# Patient Record
Sex: MALE | Race: WHITE | HISPANIC OR LATINO | Employment: UNEMPLOYED | URBAN - METROPOLITAN AREA
[De-identification: names, ages, dates, MRNs, and addresses within clinical notes are randomized per-mention and may not be internally consistent; named-entity substitution may affect disease eponyms.]

---

## 2019-09-12 ENCOUNTER — HOSPITAL ENCOUNTER (EMERGENCY)
Facility: HOSPITAL | Age: 24
Discharge: HOME | End: 2019-09-12
Attending: EMERGENCY MEDICINE

## 2019-09-12 VITALS
HEART RATE: 70 BPM | HEIGHT: 69 IN | SYSTOLIC BLOOD PRESSURE: 122 MMHG | TEMPERATURE: 98 F | BODY MASS INDEX: 29.62 KG/M2 | DIASTOLIC BLOOD PRESSURE: 75 MMHG | RESPIRATION RATE: 20 BRPM | OXYGEN SATURATION: 98 % | WEIGHT: 200 LBS

## 2019-09-12 DIAGNOSIS — S03.00XA DISLOCATION OF TEMPOROMANDIBULAR JOINT, INITIAL ENCOUNTER: Primary | ICD-10-CM

## 2019-09-12 PROCEDURE — 99152 MOD SED SAME PHYS/QHP 5/>YRS: CPT

## 2019-09-12 PROCEDURE — 63600175 PHARM REV CODE 636 W HCPCS: Performed by: PHYSICIAN ASSISTANT

## 2019-09-12 PROCEDURE — 96374 THER/PROPH/DIAG INJ IV PUSH: CPT

## 2019-09-12 PROCEDURE — 63600175 PHARM REV CODE 636 W HCPCS: Performed by: EMERGENCY MEDICINE

## 2019-09-12 PROCEDURE — 96375 TX/PRO/DX INJ NEW DRUG ADDON: CPT

## 2019-09-12 PROCEDURE — 99285 EMERGENCY DEPT VISIT HI MDM: CPT | Mod: 25

## 2019-09-12 PROCEDURE — 21480 CLTX TMPRMAND DISLC 1ST/SBSQ: CPT

## 2019-09-12 RX ORDER — DIAZEPAM 10 MG/2ML
10 INJECTION INTRAMUSCULAR
Status: COMPLETED | OUTPATIENT
Start: 2019-09-12 | End: 2019-09-12

## 2019-09-12 RX ORDER — KETOROLAC TROMETHAMINE 30 MG/ML
15 INJECTION, SOLUTION INTRAMUSCULAR; INTRAVENOUS
Status: COMPLETED | OUTPATIENT
Start: 2019-09-12 | End: 2019-09-12

## 2019-09-12 RX ORDER — PROPOFOL 10 MG/ML
100 VIAL (ML) INTRAVENOUS ONCE
Status: COMPLETED | OUTPATIENT
Start: 2019-09-12 | End: 2019-09-12

## 2019-09-12 RX ORDER — IBUPROFEN 600 MG/1
600 TABLET ORAL EVERY 6 HOURS PRN
Qty: 20 TABLET | Refills: 0 | Status: SHIPPED | OUTPATIENT
Start: 2019-09-12

## 2019-09-12 RX ADMIN — DIAZEPAM 10 MG: 5 INJECTION, SOLUTION INTRAMUSCULAR; INTRAVENOUS at 09:09

## 2019-09-12 RX ADMIN — PROPOFOL 100 MG: 10 INJECTION, EMULSION INTRAVENOUS at 10:09

## 2019-09-12 RX ADMIN — KETOROLAC TROMETHAMINE 15 MG: 30 INJECTION, SOLUTION INTRAMUSCULAR at 09:09

## 2019-09-12 NOTE — ED NOTES
23 year old male presents to ed cc of jaw pain patient reports was brushing teeth this morning yawned and jaw became dislocated patient not tolerating secretions at this time offered suction to patient patient able to suction own secretions patient awake alert ox4 no respiratory distress noted

## 2019-09-12 NOTE — ED PROVIDER NOTES
Encounter Date: 9/12/2019    SCRIBE #1 NOTE: I, Patricia Machado, am scribing for, and in the presence of,  Dr. Lefort. I have scribed the entire note.       History     Chief Complaint   Patient presents with    Jaw Pain     Pt reports had been yawning when his jaw became stuck. Possibly dislocated by visual assessment. Complaining of pain to jaw.      Raúl Grimaldo is a 23 y.o. male who  has no past medical history on file.    The patient presents to the ED due to jaw pain that occurred this morning. Patient reports he was brushing his teeth and yawning when his jaw became stuck at an open position. He reports this is the second time his jaw has dislocated. Patient has no other complaints.    The history is provided by the patient.     Review of patient's allergies indicates:  No Known Allergies  No past medical history on file.  No past surgical history on file.  No family history on file.  Social History     Tobacco Use    Smoking status: Not on file   Substance Use Topics    Alcohol use: Not on file    Drug use: Not on file     Review of Systems   Constitutional: Negative for chills and fever.   HENT: Negative for rhinorrhea, sore throat and trouble swallowing.         Jaw dislocation.   Eyes: Negative for visual disturbance.   Respiratory: Negative for cough and shortness of breath.    Cardiovascular: Negative for chest pain.   Gastrointestinal: Negative for abdominal pain, diarrhea and vomiting.   Genitourinary: Negative for dysuria and hematuria.   Musculoskeletal: Negative for back pain.   Skin: Negative for rash.   Neurological: Negative for numbness and headaches.   Hematological: Negative for adenopathy.       Physical Exam     Initial Vitals [09/12/19 0814]   BP Pulse Resp Temp SpO2   130/86 77 17 98.3 °F (36.8 °C) 99 %      MAP       --         Physical Exam    Nursing note and vitals reviewed.  Constitutional: He appears well-developed and well-nourished. He is not diaphoretic. No  distress.   HENT:   Head: Normocephalic and atraumatic.   Mouth/Throat: Oropharynx is clear and moist.   Anterior jaw discolation.    Eyes: Conjunctivae and EOM are normal.   Neck: Normal range of motion. Neck supple.   Cardiovascular: Normal rate, regular rhythm and normal heart sounds. Exam reveals no gallop and no friction rub.    No murmur heard.  Pulmonary/Chest: Breath sounds normal. He has no wheezes. He has no rhonchi. He has no rales.   Abdominal: Soft. There is no tenderness. There is no rebound and no guarding.   Musculoskeletal: Normal range of motion. He exhibits no edema or tenderness.   Lymphadenopathy:     He has no cervical adenopathy.   Neurological: He is alert and oriented to person, place, and time. He has normal strength.   Skin: Skin is warm and dry. No rash noted.         ED Course   Procedural Sedation  Date/Time: 9/12/2019 9:50 AM  Performed by: Guy J. Lefort, MD  Authorized by: Guy J. Lefort, MD   ASA Class: Class 1 - Heathy patient. No medical history.  Mallampati Score: Class 1 - Visualization of the soft palate, fauces, uvula, and anterior/posterior pillars.   NPO STATUS:  Date/Time of last solid: 9/11/2019 9:51 AM  Equipment: on cardiac monitor., suction available., on BP monitor., airway equipment available., on CO2 monitor., reversal drugs available. and on supplemental oxygen.   Sedation type: moderate (conscious) sedation  (See MAR for exact dosages of medications).  Sedatives: propofol  Patient/Family history of anesthesia or sedation complications: No  Reduction of Mandibular Dislocation  Date/Time: 9/12/2019 10:29 AM  Performed by: Guy J. Lefort, MD  Authorized by: Guy J. Lefort, MD     Consent Done?:  Yes  Universal Protocol:     Verbal consent obtained?: Yes      Written consent obtained?: Yes      Risks and benefits: Risks, benefits and alternatives were discussed      Consent given by:  Patient    Patient states understanding of procedure being performed: Yes      Patient's  understanding of procedure matches consent: Yes      Procedure consent matches procedure scheduled: Yes      Relevant documents present and verified: Yes      Patient identity confirmed:  , name and verbally with patient  Injury:     Injury location:  Jaw    Location details:  Mandible    Injury type:  Dislocation    Chronicity:  Recurrent      Pre-procedure assessment:     Patient sedated?: Yes      ASA Class:  Class 1 - Heathy patient. No medical history.    Mallampati Score:  Class 1 - Visualization of the soft palate, fauces, uvula, and anterior/posterior pillars.    Patient/Family history of anesthesia or sedation complications: No      Sedation type: moderate (conscious) sedation      Sedation:  Propofol    Vital signs: Vital signs monitored during sedation        Selections made in this section will also lock the Injury type section above.:     Complications: No    Post-procedure assessment:     Patient tolerance:  Patient tolerated the procedure well with no immediate complications     2 attempts      Labs Reviewed - No data to display       Imaging Results          X-Ray Mandible More Than 4 Views (Final result)  Result time 19 11:57:35    Final result by Charlie Esparza MD (19 11:57:35)                 Impression:      As above.      Electronically signed by: Charlie Esparza  Date:    2019  Time:    11:57             Narrative:    EXAMINATION:  XR MANDIBLE MORE THAN 4 VIEWS    CLINICAL HISTORY:  Other injury of unspecified body region, initial encounter    TECHNIQUE:  Mandible radiograph AP, lateral, and obliques.    COMPARISON:  Radiograph dated earlier same day.    FINDINGS:  There has been interval reduction of previous anterior condylar dislocation and now appear well seated.  No new abnormality.                               X-Ray Mandible More Than 4 Views (Edited Result - FINAL)  Result time 19 11:55:58    Addendum 1 of 1 by Charlie Esparza MD (19  11:55:58)      Upon review, there is anterior dislocation of the mandibular condyles relative to the glenoid fossa.      Electronically signed by: Charlie Esparza  Date:    09/12/2019  Time:    11:55               Final result by Charlie Esparza MD (09/12/19 09:24:43)                 Impression:      As above.      Electronically signed by: Charlie Esparza  Date:    09/12/2019  Time:    09:24             Narrative:    EXAMINATION:  XR MANDIBLE MORE THAN 4 VIEWS    CLINICAL HISTORY:  Other injury of unspecified body region, initial encounter    TECHNIQUE:  Mandible radiographs four views.    COMPARISON:  None    FINDINGS:  Bilateral mandibular condyles appear symmetric.  No displaced fracture.  CT maxillofacial could be performed for more sensitive assessment as clinically indicated.                              X-Rays:   Independently Interpreted Readings:   Other Readings:  Mandible: Anterior dislocation, 2nd film with improved alignment    Medical Decision Making:   Differential Diagnosis:   Differential Diagnosis includes, but is not limited to:  Hany's angina, mandibular dislocation acute necrotizing ulcerative gingivitis, epiglottitis, parotitis, gingival abscess, facial cellulitis, peritonsillar/retropharyngeal abscess, sialolithiasis, periapical abscess, pulpitis, dental fracture, dental caries, aphthous ulcer.    Independently Interpreted Test(s):   I have ordered and independently interpreted X-rays - see prior notes.  Clinical Tests:   Radiological Study: Ordered and Reviewed  ED Management:  Reduction performed in department with conscious sedation.  No airway compromise post procedure.  Discussed expected  Course of injury, appropriate follow up, avoidance of aggravating factors, and indications for ED return.                      Clinical Impression:       ICD-10-CM ICD-9-CM   1. Dislocation of temporomandibular joint, initial encounter S03.00XA 830.0   2. Dislocation T14.8XXA 839.8      Disposition:   Disposition: Discharged  Condition: Stable      Scribe attestation I, Dr. Guy Lefort, personally performed the services described in this documentation. All medical record entries made by the scribe were at my direction and in my presence. I have reviewed the chart and agree that the record reflects my personal performance and is accurate and complete. Guy Lefort, MD.  2:44 AM 09/13/2019           Guy J. Lefort, MD  09/13/19 0246

## 2019-09-12 NOTE — ED NOTES
Patient placed on cardiac montior bp cuff pulse ox co2 monitor placed on oxygen patient awake alert dr. lefort at bedside to explain procedure/ sedation to patient. Patient awake alert ox4 at this time